# Patient Record
Sex: MALE | Race: WHITE
[De-identification: names, ages, dates, MRNs, and addresses within clinical notes are randomized per-mention and may not be internally consistent; named-entity substitution may affect disease eponyms.]

---

## 2018-04-27 ENCOUNTER — HOSPITAL ENCOUNTER (EMERGENCY)
Dept: HOSPITAL 25 - ED | Age: 38
Discharge: HOME | End: 2018-04-27
Payer: SELF-PAY

## 2018-04-27 VITALS — SYSTOLIC BLOOD PRESSURE: 138 MMHG | DIASTOLIC BLOOD PRESSURE: 78 MMHG

## 2018-04-27 DIAGNOSIS — Z04.1: ICD-10-CM

## 2018-04-27 DIAGNOSIS — T14.90XA: Primary | ICD-10-CM
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
## 2018-04-27 PROCEDURE — 99282 EMERGENCY DEPT VISIT SF MDM: CPT
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
## 2019-06-01 ENCOUNTER — HOSPITAL ENCOUNTER (EMERGENCY)
Dept: HOSPITAL 25 - ED | Age: 39
Discharge: HOME | End: 2019-06-01
Payer: SELF-PAY

## 2019-06-01 ENCOUNTER — HOSPITAL ENCOUNTER (EMERGENCY)
Dept: HOSPITAL 25 - UCEAST | Age: 39
Discharge: ANOTHER HOSPITAL | End: 2019-06-01
Payer: COMMERCIAL

## 2019-06-01 VITALS — SYSTOLIC BLOOD PRESSURE: 162 MMHG | DIASTOLIC BLOOD PRESSURE: 90 MMHG

## 2019-06-01 VITALS — SYSTOLIC BLOOD PRESSURE: 132 MMHG | DIASTOLIC BLOOD PRESSURE: 80 MMHG

## 2019-06-01 DIAGNOSIS — K29.70: ICD-10-CM

## 2019-06-01 DIAGNOSIS — Z87.891: ICD-10-CM

## 2019-06-01 DIAGNOSIS — F17.290: ICD-10-CM

## 2019-06-01 DIAGNOSIS — Z88.3: ICD-10-CM

## 2019-06-01 DIAGNOSIS — R07.9: Primary | ICD-10-CM

## 2019-06-01 LAB
ALBUMIN SERPL BCG-MCNC: 4.6 G/DL (ref 3.2–5.2)
ALBUMIN/GLOB SERPL: 1.8 {RATIO} (ref 1–3)
ALP SERPL-CCNC: 41 U/L (ref 34–104)
ALT SERPL W P-5'-P-CCNC: 11 U/L (ref 7–52)
ANION GAP SERPL CALC-SCNC: 5 MMOL/L (ref 2–11)
APTT PPP: 40.8 SECONDS (ref 26–38)
AST SERPL-CCNC: 17 U/L (ref 13–39)
BASOPHILS # BLD AUTO: 0.1 10^3/UL (ref 0–0.2)
BUN SERPL-MCNC: 8 MG/DL (ref 6–24)
BUN/CREAT SERPL: 8.9 (ref 8–20)
CALCIUM SERPL-MCNC: 9.6 MG/DL (ref 8.6–10.3)
CHLORIDE SERPL-SCNC: 105 MMOL/L (ref 101–111)
EOSINOPHIL # BLD AUTO: 0.2 10^3/UL (ref 0–0.6)
GLOBULIN SER CALC-MCNC: 2.5 G/DL (ref 2–4)
GLUCOSE SERPL-MCNC: 90 MG/DL (ref 70–100)
HCO3 SERPL-SCNC: 27 MMOL/L (ref 22–32)
HCT VFR BLD AUTO: 40 % (ref 42–52)
HGB BLD-MCNC: 14 G/DL (ref 14–18)
INR PPP/BLD: 0.99 (ref 0.82–1.09)
LYMPHOCYTES # BLD AUTO: 1.9 10^3/UL (ref 1–4.8)
MAGNESIUM SERPL-MCNC: 2.1 MG/DL (ref 1.9–2.7)
MCH RBC QN AUTO: 31 PG (ref 27–31)
MCHC RBC AUTO-ENTMCNC: 35 G/DL (ref 31–36)
MCV RBC AUTO: 89 FL (ref 80–94)
MONOCYTES # BLD AUTO: 0.6 10^3/UL (ref 0–0.8)
NEUTROPHILS # BLD AUTO: 3.5 10^3/UL (ref 1.5–7.7)
NRBC # BLD AUTO: 0 10^3/UL
NRBC BLD QL AUTO: 0.1
PLATELET # BLD AUTO: 166 10^3/UL (ref 150–450)
POTASSIUM SERPL-SCNC: 3.9 MMOL/L (ref 3.5–5)
PROT SERPL-MCNC: 7.1 G/DL (ref 6.4–8.9)
RBC # BLD AUTO: 4.49 10^6 /UL (ref 4.18–5.48)
SODIUM SERPL-SCNC: 137 MMOL/L (ref 135–145)
TROPONIN I SERPL-MCNC: 0 NG/ML (ref ?–0.04)
WBC # BLD AUTO: 6.2 10^3/UL (ref 3.5–10.8)

## 2019-06-01 PROCEDURE — 71046 X-RAY EXAM CHEST 2 VIEWS: CPT

## 2019-06-01 PROCEDURE — 85379 FIBRIN DEGRADATION QUANT: CPT

## 2019-06-01 PROCEDURE — 85730 THROMBOPLASTIN TIME PARTIAL: CPT

## 2019-06-01 PROCEDURE — 84484 ASSAY OF TROPONIN QUANT: CPT

## 2019-06-01 PROCEDURE — 81003 URINALYSIS AUTO W/O SCOPE: CPT

## 2019-06-01 PROCEDURE — 99214 OFFICE O/P EST MOD 30 MIN: CPT

## 2019-06-01 PROCEDURE — 85025 COMPLETE CBC W/AUTO DIFF WBC: CPT

## 2019-06-01 PROCEDURE — 36415 COLL VENOUS BLD VENIPUNCTURE: CPT

## 2019-06-01 PROCEDURE — 93005 ELECTROCARDIOGRAM TRACING: CPT

## 2019-06-01 PROCEDURE — G0463 HOSPITAL OUTPT CLINIC VISIT: HCPCS

## 2019-06-01 PROCEDURE — 99283 EMERGENCY DEPT VISIT LOW MDM: CPT

## 2019-06-01 PROCEDURE — 85610 PROTHROMBIN TIME: CPT

## 2019-06-01 PROCEDURE — 83735 ASSAY OF MAGNESIUM: CPT

## 2019-06-01 PROCEDURE — 83605 ASSAY OF LACTIC ACID: CPT

## 2019-06-01 PROCEDURE — 86141 C-REACTIVE PROTEIN HS: CPT

## 2019-06-01 PROCEDURE — 80053 COMPREHEN METABOLIC PANEL: CPT

## 2019-06-01 NOTE — ED
HPI Chest Pain





- HPI Summary


HPI Summary: 





This patient is a 38 year old M [place] [facility]  with a chief 

complaint of left anterior chest pain with numbness to the jaw since 11:30 this 

morning. Additionally reports left shoulder pain, that is chronic, and 

diaphoresis at rest. At work he was told by his co-workers he was pale. At 

worst Pain rated 6/10 that is currently improved to 2/10 after given GI 

cocktail at [facility]. Denies history of anxiety and panic attacks. Current smoker. 

Reports FMHx of MI in a 47 y/o uncle. Denies significant PMHx. Patient 

additionally given 324 ASA at [facility].





- History of Current Complaint


Chief Complaint: EDChestWallPain


Time Seen by Provider: 06/01/19 13:54


Hx Obtained From: Patient


Onset/Duration: Started Hours Ago


Time of Onset: 11:30


Timing: Constant


Initial Severity: Moderate


Current Severity: Mild


Pain Intensity: 2


Pain Scale Used: 0-10 Numeric


Chest Pain Location: Left Anterior


Chest Pain Radiates: Yes


Chest Pain Radiates To:: Shoulder, Jaw


Alleviating Factor(s): Medication


Associated Signs and Symptoms: Positive: Chest Pain, Diaphoresis, Other: - 

pallor, jaw pain





- Allergy/Home Medications


Allergies/Adverse Reactions: 


 Allergies











Allergy/AdvReac Type Severity Reaction Status Date / Time


 


amoxicillin AdvReac Severe Diarrhea Verified 06/01/19 13:51














PMH/Surg Hx/FS Hx/Imm Hx


Endocrine/Hematology History: 


   Denies: Hx Diabetes, Hx Thyroid Disease


Cardiovascular History: 


   Denies: Hx Hypertension


Respiratory History: 


   Denies: Hx Asthma, Hx Chronic Obstructive Pulmonary Disease (COPD)


GI History: Reports: Other GI Disorders - "SENSITIVE STOMACH"


   Denies: Hx Ulcer


Sensory History: 


   Denies: Hx Contacts or Glasses, Hx Hearing Aid


Opthamlomology History: 


   Denies: Hx Contacts or Glasses





- Surgical History


Surgery Procedure, Year, and Place: L rotator cuff reduction-2003-[facility]


Hx Anesthesia Reactions: No


Infectious Disease History: No


Infectious Disease History: Reports: Hx of Known/Suspected MRSA - in his teens


   Denies: Hx Hepatitis, Hx Human Immunodeficiency Virus (HIV), History Other 

Infectious Disease, Traveled Outside the [place] in Last 30 Days





- Family History


Known Family History: Positive: Other - gal bladder dz





- Social History


Alcohol Use: Rare


Substance Use Type: Reports: Marijuana


Substance Use Comment - Amount & Last Used: OR CBD OIL WHEN STOMACH IS UPSET


Smoking Status (MU): Former Smoker


Type: Cigars, Smokeless Tobacco


Amount Used/How Often: 2 cigars per day- USING VAPOR- AND WEANING FROM





Review of Systems


Positive: Skin Diaphoresis


Positive: Chest Pain


Positive: Myalgia - shoulder


Positive: Other - pallor


Positive: Numbness - jaw


All Other Systems Reviewed And Are Negative: Yes





Physical Exam





- Summary


Physical Exam Summary: 





GENERAL: Patient is a well-developed and nourished M who is lying comfortable 

in the stretcher. Patient is not in any acute respiratory distress.


HEAD AND FACE: Normocephalic


EYES: PERRLA, EOMI x 2.


EARS: Hearing grossly intact.


MOUTH: Oropharynx within normal limits.


NECK: Supple, trachea is midline, no adenopathy, no JVD, no carotid bruit.


CHEST: Symmetric, no tenderness at palpation


LUNGS: Clear to auscultation bilaterally. No wheezing or crackles.


CVS: Regular rate and rhythm, S1 and S2 present, no murmurs or gallops 

appreciated.


ABDOMEN: Soft, non-tender. Bowel sounds are normal. No abnormal abdominal 

pulsations.


EXTREMITIES: Full ROM in all major joints, no edema, no cyanosis or clubbing.


NEURO: Alert and oriented x 3. No acute neurological deficits. Speech is normal 

and follows commands.


SKIN: Dry and warm





Triage Information Reviewed: Yes


Vital Signs On Initial Exam: 


 Initial Vitals











Temp Pulse Resp BP Pulse Ox


 


 98.5 F   64   16   139/94   99 


 


 06/01/19 13:47  06/01/19 13:47  06/01/19 13:47  06/01/19 13:47  06/01/19 13:47











Vital Signs Reviewed: Yes





Diagnostics





- Vital Signs


 Vital Signs











  Temp Pulse Resp BP Pulse Ox


 


 06/01/19 13:47  98.5 F  64  16  139/94  99














- Laboratory


Result Diagrams: 


 06/01/19 14:13





 06/01/19 14:13


Lab Statement: Any lab studies that have been ordered have been reviewed, and 

results considered in the medical decision making process.





- Radiology


  ** CXR


Radiology Interpretation Completed By: Radiologist


Summary of Radiographic Findings: No radiographic evidence of acute 

cardiopulmonary disease.  ED Physician has reviewed this report.





- EKG


  ** 1352


Cardiac Rate: NL - 65 BPM


EKG Rhythm: Sinus Rhythm


Summary of EKG Findings: incomplete RBBB





Chest Pain Course/Dx





- Course


Course Of Treatment: 38 year old M [place] [facility] [place] [facility]  with a chief 

complaint of left anterior chest pain with numbness to the jaw since 11:30 this 

morning. Additionally reports left shoulder pain, that is chronic, and 

diaphoresis at rest. Reports FMHx of MI in a 47 y/o uncle. Denies significant 

PMHx. Patient additionally given 324 ASA at [facility]. Bloodwork and UA obtained, 

with two negative troponin results. CXR reveals, "No radiographic evidence of 

acute cardiopulmonary disease." per radiologist. EKG reveals no ischemic 

changes. Results discussed with patient. Explained to patient even though his 

symptoms were improved with the GI cocktail he needs to follow up with a 

cardiologist for further management and evaluation. Patient is agreeable with 

this plan.





- Diagnoses


Provider Diagnoses: 


 Chest pain








Discharge





- Sign-Out/Discharge


Documenting (check all that apply): Patient Departure - discharge


Patient Received Moderate/Deep Sedation with Procedure: No





- Discharge Plan


Condition: Stable


Disposition: HOME


Prescriptions: 


Pantoprazole TAB * [Protonix TAB*] 40 mg PO DAILY #30 tab


Patient Education Materials:  Chest Pain (ED), Gastroesophageal Reflux Disease (

ED)


Referrals: 


Hui Moraes MD [Primary Care Provider] - 2 Days


Usama Melara DO [Medical Doctor] - 2 Days


Additional Instructions: 


RETURN TO THE EMERGENCY DEPARTMENT FOR CHANGING OR WORSENING SYMPTOMS.





Otherwise follow up with your PCP and a cardiologist.





- Billing Disposition and Condition


Condition: STABLE


Disposition: Home





- Attestation Statements


Document Initiated by Scribe: Yes


Documenting Scribe: Debby Leblanc


Provider For Whom Flor is Documenting (Include Credential): Jose Vasquez MD


Scribe Attestation: 


Debby LINO, scribed for Jose Vasquez MD on 06/01/19 at 2108. 


Scribe Documentation Reviewed: Yes


Provider Attestation: 


The documentation as recorded by the Debby valencia accurately reflects 

the service I personally performed and the decisions made by me, Jose Vasquez MD


Status of Scribe Document: Viewed

## 2019-10-14 ENCOUNTER — HOSPITAL ENCOUNTER (EMERGENCY)
Dept: HOSPITAL 25 - UCEAST | Age: 39
Discharge: HOME | End: 2019-10-14
Payer: SELF-PAY

## 2019-10-14 VITALS — SYSTOLIC BLOOD PRESSURE: 108 MMHG | DIASTOLIC BLOOD PRESSURE: 58 MMHG

## 2019-10-14 DIAGNOSIS — S93.602A: Primary | ICD-10-CM
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
## 2019-10-14 DIAGNOSIS — Z87.828: ICD-10-CM

## 2019-10-14 DIAGNOSIS — Z88.0: ICD-10-CM

## 2019-10-14 DIAGNOSIS — Z87.891: ICD-10-CM
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
## 2019-10-14 PROCEDURE — 99212 OFFICE O/P EST SF 10 MIN: CPT

## 2019-10-14 PROCEDURE — G0463 HOSPITAL OUTPT CLINIC VISIT: HCPCS

## 2019-10-14 NOTE — UC
Lower Extremity/Ankle HPI





- HPI Summary


HPI Summary: 


38 y/o male presents to the urgent care  c/p of left foot pain and mild 

swelling s/p injury w/ a fallen hammer last night. Pt reports he was trying to 

get some linens from a top drawer when a hammer fell off on top os his left 

foot. He was wearing shoes. He applied ice and has taken Ibuprofen PO 400mg to 

alleviate symptoms. Last dose was this morning. Pain is 5/10 and worse w/ 

walking and associated w/ mild tingling sensation over the lateral side of 

foot. Hx of left foot sprain in the past. Pt denies fever, calf pain, SOB, 

chest pain, abdominal pain, ankle pain, N/V/D. 








- History of Current Complaint


Stated Complaint: FOOT INJURY


Time Seen by Provider: 10/14/19 17:15


Hx Obtained From: Patient


Onset/Duration: Gradual Onset, Lasting Days - 1 day, Still Present, Worse Since 

- thsi morning


Severity Initially: Moderate


Severity Currently: Moderate


Pain Intensity: 5


Pain Scale Used: 0-10 Numeric


Aggravating Factor(s): Standing, Ambulation


Alleviating Factor(s): Rest, Elevation, OTC Meds - Ibuprofen PO 400mg this 

morning


Able to Bear Weight: Yes





- Risk Factors


Gout Risk Factors: Negative


DVT Risk Factors: Negative


Septic Arthritis Risk Factor: Negative





- Allergies/Home Medications


Allergies/Adverse Reactions: 


 Allergies











Allergy/AdvReac Type Severity Reaction Status Date / Time


 


amoxicillin AdvReac Severe Diarrhea Verified 10/14/19 17:19














PMH/Surg Hx/FS Hx/Imm Hx


Previously Healthy: Yes - Pt denies PMHX





- Surgical History


Surgical History: Yes


Surgery Procedure, Year, and Place: L rotator cuff reduction-2003-[facility]





- Family History


Known Family History: Positive: Diabetes, Other - gall bladder dz





- Social History


Occupation: Employed Full-time


Lives: With Family


Alcohol Use: Rare


Substance Use Type: Marijuana


Substance Use Comment - Amount & Last Used: OR CBD OIL WHEN STOMACH IS UPSET


Smoking Status (MU): Former Smoker


Type: Cigars, Smokeless Tobacco


Amount Used/How Often: 2 cigars per day- USING VAPOR- AND WEANING FROM


When Did the Patient Quit Smoking/Using Tobacco: 06/2015


Household Exposure Type: Cigarettes





Review of Systems


All Other Systems Reviewed And Are Negative: Yes


Constitutional: Positive: Negative


Skin: Positive: Negative


Eyes: Positive: Negative


ENT: Positive: Negative


Respiratory: Positive: Negative


Cardiovascular: Positive: Negative


Gastrointestinal: Positive: Negative


Genitourinary: Positive: Negative


Motor: Positive: Negative


Neurovascular: Positive: Negative


Musculoskeletal: Positive: Decreased ROM - left foot, Other: - left foot pain s/

p inury w/ a falling hammer


Neurological: Positive: Negative


Psychological: Positive: Negative


Is Patient Immunocompromised?: No





Physical Exam





- Summary


Physical Exam Summary: 





Vital Signs Reviewed: Yes


General : well developed, well nourished male w/o any apparent distress


Eyes: Positive: Conjunctiva Clear - PERRLA, EOMI


ENT: Positive: Normal ENT inspection, Hearing grossly normal, Pharynx normal, 

TMs normal


Neck: Positive: Supple, Nontender, No Lymphadenopathy


Respiratory: Positive: Chest non-tender, Lungs clear, Normal breath sounds, No 

respiratory distress


Cardiovascular: Positive: RRR, No Murmur, Pulses Normal


Abdomen Description: Positive: Nontender, No Organomegaly, Soft.  Negative: CVA 

Tenderness (R), CVA Tenderness (L)


Bowel Sounds: Positive: Present


Musculoskeletal: Positive: Strength Intact, ROM Intact, No Edema, Left Foot/Toes

: Pt is able to bear weight but ambulate with mild limping.  RT foot :No 

surface trauma, ecchymosis, erythema, lesions, ulcers or break in skin 

integrity.  The L foot is without obvious asymmetry or deformity when compared 

to the R foot.  No bony step-off, No tenderness to palpation over toes, point 

tenderness over the dorsal side of mid foot and  base of the 4th and 5th 

metatarsal and sole at the same level, no tenderness of  hindfoot, Decrease  

plantar/dorsiflexion, inversion/eversion due to pain.  Distal motor and 

neurovascular status are intact.


Neurological Exam: Normal


Psychological Exam: Normal


Skin Exam: Normal














Triage Information Reviewed: Yes





Lower Extremity Course/Dx





- Course


Course Of Treatment: 





38 y/o male presents to the urgent care  c/p of left foot pain and mild 

swelling s/p injury w/ a fallen hammer last night. Pt reports he was trying to 

get some linens from a top drawer when a hammer fell off on top os his left 

foot. He was wearing shoes. He applied ice and has taken Ibuprofen PO 400mg to 

alleviate symptoms. Last dose was this morning. Pain is 5/10 and worse w/ 

walking and associated w/ mild tingling sensation over the lateral side of 

foot. Hx of left foot sprain in the past. Pt denies fever, calf pain, SOB, 

chest pain, abdominal pain, ankle pain, N/V/D. Hx obtained. LF foot X-ray 

ordered, IMPRESSION:


NO ACUTE OSSEOUS INJURY. IF SYMPTOMS PERSIST, RECOMMEND REPEAT IMAGING. Pt 

probably w/ a left foot sprain.  Pt's foot immobilized with ace-bandage and 

given a post-op shoe to avoid flexion. Also Pt given crutches to avoid weight 

bearing since Pt is limping until symptoms resolve. Advised RICE, and Rx 

Tylenol PO for pain, If not improvement of symptoms to f/u with Orthopedic DR Covarrubias or Sports Medicine referral for further evaluation and treatment. Pt 

understood and agreed with plan of care. Pt left clinic ambulating w/ help of 

crutches. 











- Differential Dx/Diagnosis


Differential Diagnosis/HQI/PQRI: Contusion, Dislocation, Fracture (Closed), 

Fracture (Open), Sprain, Strain, Tendonitis


Provider Diagnosis: 


 Injury of left foot, Sprain of foot, left








Discharge ED





- Sign-Out/Discharge


Documenting (check all that apply): Patient Departure - D/C home


All imaging exams completed and their final reports reviewed: Yes





- Discharge Plan


Condition: Stable


Disposition: HOME


Patient Education Materials:  Foot Sprain (ED)


Referrals: 


Hui Moraes MD [Primary Care Provider] - 


Mary Anne Covarrubias MD [Medical Doctor] - 1 Week


[facility] [Provider Group] - 1 Week


Additional Instructions: 


1-Please Tylenol PO q6-8hrs prn after meals as directed to alleviate pain and 

swelling.


2-Please apply ice, keep your  foot immobilized with the Ace bandage and post-

op shoe. Use the crutches to avoid weight bearing until symptoms resolve. Avoid 

strenuous exercise or standing for  long periods of time


3- Please f/u with your Orthopedic Dr Covarrubias or Sports Medicine  if not 

improvement of symptoms in 1 week for further evaluation and treatment.








- Billing Disposition and Condition


Condition: STABLE


Disposition: Home